# Patient Record
Sex: MALE | Race: BLACK OR AFRICAN AMERICAN | NOT HISPANIC OR LATINO | URBAN - METROPOLITAN AREA
[De-identification: names, ages, dates, MRNs, and addresses within clinical notes are randomized per-mention and may not be internally consistent; named-entity substitution may affect disease eponyms.]

---

## 2018-10-26 ENCOUNTER — EMERGENCY (EMERGENCY)
Facility: HOSPITAL | Age: 33
LOS: 1 days | Discharge: HOME | End: 2018-10-26
Admitting: EMERGENCY MEDICINE
Payer: COMMERCIAL

## 2018-10-26 VITALS
HEART RATE: 82 BPM | TEMPERATURE: 98 F | OXYGEN SATURATION: 99 % | RESPIRATION RATE: 16 BRPM | DIASTOLIC BLOOD PRESSURE: 84 MMHG | SYSTOLIC BLOOD PRESSURE: 165 MMHG

## 2018-10-26 VITALS
HEART RATE: 80 BPM | DIASTOLIC BLOOD PRESSURE: 78 MMHG | SYSTOLIC BLOOD PRESSURE: 147 MMHG | RESPIRATION RATE: 16 BRPM | TEMPERATURE: 98 F | OXYGEN SATURATION: 99 %

## 2018-10-26 PROCEDURE — 93971 EXTREMITY STUDY: CPT | Mod: 26,LT

## 2018-10-26 PROCEDURE — 99284 EMERGENCY DEPT VISIT MOD MDM: CPT

## 2018-10-26 PROCEDURE — 73564 X-RAY EXAM KNEE 4 OR MORE: CPT | Mod: 26,LT

## 2018-10-26 RX ORDER — ACETAMINOPHEN 500 MG
650 TABLET ORAL ONCE
Qty: 0 | Refills: 0 | Status: COMPLETED | OUTPATIENT
Start: 2018-10-26 | End: 2018-10-26

## 2018-10-26 RX ADMIN — Medication 650 MILLIGRAM(S): at 13:12

## 2018-10-26 NOTE — ED PROVIDER NOTE - CARE PLAN
Principal Discharge DX:	Knee pain  Assessment and plan of treatment:	Follow up with your Primary Medical Doctor in 1-2 days.  Follow up with Orthopedics in 1-2 days see attached list.  Rest knee.  Ace wrap.  Use crutches to ambulate.  Take Ibuprofen 600mg orally every 8 hours as needed for pain take with food.  Return to the ER for any persistent/worsening or new symptoms numbness, tingling, fevers, chills or any concerning symptoms.

## 2018-10-26 NOTE — ED ADULT TRIAGE NOTE - CHIEF COMPLAINT QUOTE
Pt. c/o b/l knee pain x few months, worsening this past week with intermittent swelling to left ankle. Denies any injury but states he has been standing on his feet a lot for work. Seen at [facility] on Tuesday-xray negative. 600mg Motrin taken this AM with no relief. Pt. c/o b/l knee pain x few months, worsening this past week with intermittent swelling to left ankle. Denies any injury but states he has been standing on his feet a lot for work. Seen at [facility] on Tuesday-xray negative. 600mg Motrin taken at 9am with no relief.

## 2018-10-26 NOTE — ED PROVIDER NOTE - OBJECTIVE STATEMENT
32 y/o male with no significant PMHx presents to the ER c/o posterior Left knee pain x several months worse the past 1 week.  Pt reports lower left leg swelling yesterday. Pt denies fall, trauma, weakness, numbness, fevers, calf pain, chills, chest pain, shortness of breath.

## 2018-10-26 NOTE — ED PROCEDURE NOTE - NS ED PERI VASCULAR NEG
fingers/toes warm to touch/capillary refill time < 2 seconds/no cyanosis of extremity/no paresthesia/no swelling

## 2018-10-26 NOTE — ED PROVIDER NOTE - MEDICAL DECISION MAKING DETAILS
34 y/o male with no significant PMHx presents to the ER c/o posterior Left knee pain x several months worse the past 1 week. Pt is well appearing, NAD, likely ligamentous injury, will obtain xray, u/s to r/o DVT given hx of swelling low suspicion for DVT.  Follow up Orthopedics.

## 2018-10-26 NOTE — ED PROVIDER NOTE - PLAN OF CARE
Follow up with your Primary Medical Doctor in 1-2 days.  Follow up with Orthopedics in 1-2 days see attached list.  Rest knee.  Ace wrap.  Use crutches to ambulate.  Take Ibuprofen 600mg orally every 8 hours as needed for pain take with food.  Return to the ER for any persistent/worsening or new symptoms numbness, tingling, fevers, chills or any concerning symptoms.

## 2018-10-26 NOTE — ED PROVIDER NOTE - CPE EDP GASTRO NORM
normal...
At length discussion with patient in regards to the head inj.  Discussed importance of prompt, close medical follow-up.  Discussed importance of avoiding activities where the patient would be at risk for further head injury, ie contact sports, for a minimum of 2 weeks.  Discussed head injury precautions and instructions as well.  Patient demonstrates understanding of all instructions.    Patient doing well, no acute complaints. Discussed with patient about the nature of the neck pain and the importance of outpatient follow-up for continued workup, evaluation and definitive diagnosis.  Discussed neck pain and neurologic precautions including numbness, tingling, weakness, fever, and changes in bowel/bladder.  An opportunity to ask questions was given . Understanding of all instructions and precautions was verbalized and the patient will follow-up as outpatient as soon as possible. Patient also understands the possibility of needing additional imaging, ie MRI as outpatient. Patient will return with any changes, concerns, or any worsening / persistent symptoms.  All results were explained to patient and family and a copy of all available results given.

## 2018-10-26 NOTE — ED PROVIDER NOTE - PHYSICAL EXAMINATION
Left knee: +TTP at posterior aspect of knee, no swelling noted, no redness, no temp change, FROM, NV intact, 2+ pulses, no calf TTP, no lower leg swelling.  Pt ambulatory with slight limp.